# Patient Record
Sex: FEMALE | Race: WHITE
[De-identification: names, ages, dates, MRNs, and addresses within clinical notes are randomized per-mention and may not be internally consistent; named-entity substitution may affect disease eponyms.]

---

## 2017-09-12 ENCOUNTER — HOSPITAL ENCOUNTER (OUTPATIENT)
Dept: HOSPITAL 75 - RAD | Age: 57
End: 2017-09-12
Attending: FAMILY MEDICINE
Payer: COMMERCIAL

## 2017-09-12 DIAGNOSIS — Z12.31: Primary | ICD-10-CM

## 2017-09-12 PROCEDURE — 77067 SCR MAMMO BI INCL CAD: CPT

## 2018-05-15 ENCOUNTER — HOSPITAL ENCOUNTER (OUTPATIENT)
Dept: HOSPITAL 75 - RAD | Age: 58
End: 2018-05-15
Attending: NEUROLOGICAL SURGERY
Payer: COMMERCIAL

## 2018-05-15 DIAGNOSIS — M47.816: Primary | ICD-10-CM

## 2018-05-15 DIAGNOSIS — M41.86: ICD-10-CM

## 2018-05-15 DIAGNOSIS — M46.86: ICD-10-CM

## 2018-05-15 PROCEDURE — 72114 X-RAY EXAM L-S SPINE BENDING: CPT

## 2018-05-15 NOTE — DIAGNOSTIC IMAGING REPORT
INDICATION: Chronic back pain.



COMPARISON: 07/31/2013.



FINDINGS: Frontal and lateral radiographic views of the lumbar

spine were obtained. Evaluation of the static alignment

demonstrates S-shaped scoliotic deformity. AP static alignment is

intact. There is no significant rosenda- or retro-listhesis. There

is no abnormal translation with flexion or extension.



Vertebral body heights are preserved. There is no evidence of

acute fracture. There are multilevel degenerative changes

consisting of intervertebral disc height loss with anterior and

posterior disc osteophyte complex formations, as well as endplate

sclerosis and multilevel facet arthropathy. These changes appear

greatest at the L3-L4 and L4-L5 levels. Surrounding soft tissue

structures are unremarkable.



IMPRESSION:

1. No radiographic evidence of acute fracture or dislocation in

the lumbar spine.

2. S-shaped scoliotic deformity with advanced multilevel

degenerative changes of the lumbar spine as described above.



Dictated by: 



  Dictated on workstation # [id number]

## 2018-09-13 ENCOUNTER — HOSPITAL ENCOUNTER (OUTPATIENT)
Dept: HOSPITAL 75 - RAD | Age: 58
End: 2018-09-13
Attending: FAMILY MEDICINE
Payer: COMMERCIAL

## 2018-09-13 DIAGNOSIS — Z12.31: Primary | ICD-10-CM

## 2018-09-13 PROCEDURE — 77067 SCR MAMMO BI INCL CAD: CPT

## 2018-09-13 NOTE — DIAGNOSTIC IMAGING REPORT
INDICATION: Routine screening.



COMPARISON: Comparison is made with prior mammograms from

09/12/2017 and 09/08/2016.



TECHNIQUE: 2D and 3D bilateral screening mammography was

performed with computer-aided Detection (CAD) system.



FINDINGS: Scattered fibroglandular densities are identified

bilaterally. The parenchymal pattern is stable. No mass or

malignant appearing microcalcifications are seen. The axillae are

unremarkable.



IMPRESSION: No mammographic features suspicious for malignancy

are identified.



ACR BI-RADS Category 1: Negative.

Result letter will be mailed to the patient.

Note:  At least 10% of breast cancer is not imaged by

mammography.



Dictated by: 



  Dictated on workstation # [id number]

## 2019-09-17 ENCOUNTER — HOSPITAL ENCOUNTER (OUTPATIENT)
Dept: HOSPITAL 75 - RAD | Age: 59
End: 2019-09-17
Attending: FAMILY MEDICINE
Payer: COMMERCIAL

## 2019-09-17 DIAGNOSIS — Z12.31: Primary | ICD-10-CM

## 2019-09-17 PROCEDURE — 77067 SCR MAMMO BI INCL CAD: CPT

## 2019-09-17 NOTE — DIAGNOSTIC IMAGING REPORT
INDICATION: Routine screening.



COMPARISON: Comparison is made with prior mammograms from

09/13/2018 and 09/12/2017.



TECHNIQUE: 2-D and 3-D bilateral screening mammography was

performed. The current study was also evaluated with a Computer

Aided Detection (CAD) system. 3-D tomosynthesis was also

performed and reviewed.



FINDINGS: Scattered fibroglandular densities are identified

bilaterally. No mass or malignant-appearing microcalcifications

are seen. There are benign calcifications bilaterally. The

axillae are unremarkable.



IMPRESSION: No mammographic features suspicious for malignancy

are identified.



ACR BI-RADS Category 2: Benign findings.

Result letter will be mailed to the patient.

Note: At least 10% of breast cancer is not imaged by mammography.



Dictated by: 



  Dictated on workstation # [id number]

## 2020-09-18 ENCOUNTER — HOSPITAL ENCOUNTER (OUTPATIENT)
Dept: HOSPITAL 75 - RAD | Age: 60
End: 2020-09-18
Attending: FAMILY MEDICINE
Payer: COMMERCIAL

## 2020-09-18 DIAGNOSIS — Z12.31: Primary | ICD-10-CM

## 2020-09-18 PROCEDURE — 77063 BREAST TOMOSYNTHESIS BI: CPT

## 2020-09-18 PROCEDURE — 77067 SCR MAMMO BI INCL CAD: CPT

## 2020-09-18 NOTE — DIAGNOSTIC IMAGING REPORT
INDICATION: Routine screening.



COMPARISON is made with prior mammograms from 9/17/2019 and

9/13/2018.



2-D and 3-D bilateral screening mammography was performed with

CAD.



Scattered fibroglandular densities are identified bilaterally. No

dominant mass or malignant appearing microcalcifications are

seen. There are benign calcifications. Axillae are unremarkable.



IMPRESSION: 

BI-RADS Category 2.

No mammographic features suspicious for malignancy are

identified.



ACR BI-RADS Category 2: Benign findings.

Result letter will be mailed to the patient.

Note: At least 10% of breast cancer is not imaged by mammography.



Dictated by: 



  Dictated on workstation # [id number]

## 2021-09-21 ENCOUNTER — HOSPITAL ENCOUNTER (OUTPATIENT)
Dept: HOSPITAL 75 - RAD | Age: 61
End: 2021-09-21
Attending: FAMILY MEDICINE
Payer: COMMERCIAL

## 2021-09-21 DIAGNOSIS — Z12.31: Primary | ICD-10-CM

## 2021-09-21 PROCEDURE — 77067 SCR MAMMO BI INCL CAD: CPT

## 2021-09-21 PROCEDURE — 77063 BREAST TOMOSYNTHESIS BI: CPT

## 2021-09-21 NOTE — DIAGNOSTIC IMAGING REPORT
INDICATION: 

Screening.



TECHNIQUE: 

The current study was also evaluated with a Computer Aided

Detection (CAD) system. 3D Tomographic imaging was also

performed.



COMPARISON:  

09/18/2020, 09/17/2019, and 09/13/2018.



FINDINGS: 

There are scattered fibroglandular densities. There are a few

benign type calcifications. There is no dominant mass, spiculated

lesion, or suspicious calcification identified. The skin,

nipples, and axillae are unremarkable.



IMPRESSION:   

Benign findings.



ACR BI-RADS Category 2: Benign findings.

Result letter will be mailed to the patient.

Note: At least 10% of breast cancer is not imaged by mammography.



Dictated by: 



  Dictated on workstation # [id number]

## 2022-09-27 ENCOUNTER — HOSPITAL ENCOUNTER (OUTPATIENT)
Dept: HOSPITAL 75 - RAD | Age: 62
End: 2022-09-27
Attending: FAMILY MEDICINE
Payer: COMMERCIAL

## 2022-09-27 DIAGNOSIS — Z12.31: Primary | ICD-10-CM

## 2022-09-27 PROCEDURE — 77067 SCR MAMMO BI INCL CAD: CPT

## 2022-09-27 PROCEDURE — 77063 BREAST TOMOSYNTHESIS BI: CPT

## 2022-09-27 NOTE — DIAGNOSTIC IMAGING REPORT
Indication: Routine screening.



Comparison is made with prior mammogram 9/21/2021 and 9/18/2020.



2-D and 3-D bilateral screening mammography was performed with

CAD.



CAD is utilized.



The current study was also evaluated with a Computer Aided

Detection (CAD) system.



Scattered fibroglandular densities are identified bilaterally.

The parenchymal pattern is stable. No mass or malignant-appearing

microcalcifications are seen. Axillae are unremarkable.



IMPRESSION: BI-RADS Category 1



No mammographic features suspicious for malignancy are

identified.



ACR BI-RADS Category 1: Negative.

Result letter will be mailed to the patient.

Note:  At least 10% of breast cancer is not imaged by

mammography.



Dictated by: 



  Dictated on workstation # [id number]

## 2023-10-09 ENCOUNTER — HOSPITAL ENCOUNTER (OUTPATIENT)
Dept: HOSPITAL 75 - RAD | Age: 63
End: 2023-10-09
Attending: FAMILY MEDICINE
Payer: COMMERCIAL

## 2023-10-09 DIAGNOSIS — Z12.31: Primary | ICD-10-CM

## 2023-10-09 PROCEDURE — 77063 BREAST TOMOSYNTHESIS BI: CPT

## 2023-10-09 PROCEDURE — 77067 SCR MAMMO BI INCL CAD: CPT

## 2023-10-10 NOTE — DIAGNOSTIC IMAGING REPORT
INDICATION: 

Screening.



EXAMINATION:

Bilateral digital 2D and 3D screening with CAD.



COMPARISON: 

September 2022, September 2021, and September 2020. 



BREAST DENSITY: 

2.



FINDINGS:

No breast mass, spiculated lesion, architectural distortion,

suspicious calcifications, or changes to suggest malignancy. 



IMPRESSION:

Negative. 



ACR BI-RADS Category 1: Negative.

Result letter will be mailed to the patient.

Note:  At least 10% of breast cancer is not imaged by

mammography.



Dictated by: 



  Dictated on workstation # [id number]